# Patient Record
Sex: MALE | Race: AMERICAN INDIAN OR ALASKA NATIVE | NOT HISPANIC OR LATINO | Employment: UNEMPLOYED | URBAN - METROPOLITAN AREA
[De-identification: names, ages, dates, MRNs, and addresses within clinical notes are randomized per-mention and may not be internally consistent; named-entity substitution may affect disease eponyms.]

---
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
## 2020-06-27 ENCOUNTER — HOSPITAL ENCOUNTER (EMERGENCY)
Facility: MEDICAL CENTER | Age: 20
End: 2020-06-27
Attending: EMERGENCY MEDICINE
Payer: MEDICAID

## 2020-06-27 VITALS
WEIGHT: 175.49 LBS | HEART RATE: 72 BPM | HEIGHT: 69 IN | TEMPERATURE: 97.8 F | RESPIRATION RATE: 18 BRPM | DIASTOLIC BLOOD PRESSURE: 87 MMHG | OXYGEN SATURATION: 96 % | BODY MASS INDEX: 25.99 KG/M2 | SYSTOLIC BLOOD PRESSURE: 151 MMHG

## 2020-06-27 DIAGNOSIS — K08.89 PAIN, DENTAL: ICD-10-CM

## 2020-06-27 DIAGNOSIS — Z20.822 SUSPECTED COVID-19 VIRUS INFECTION: ICD-10-CM

## 2020-06-27 LAB
COVID ORDER STATUS COVID19: NORMAL
SARS-COV-2 RNA RESP QL NAA+PROBE: NOTDETECTED
SPECIMEN SOURCE: NORMAL

## 2020-06-27 PROCEDURE — U0003 INFECTIOUS AGENT DETECTION BY NUCLEIC ACID (DNA OR RNA); SEVERE ACUTE RESPIRATORY SYNDROME CORONAVIRUS 2 (SARS-COV-2) (CORONAVIRUS DISEASE [COVID-19]), AMPLIFIED PROBE TECHNIQUE, MAKING USE OF HIGH THROUGHPUT TECHNOLOGIES AS DESCRIBED BY CMS-2020-01-R: HCPCS | Mod: EDC

## 2020-06-27 PROCEDURE — 99283 EMERGENCY DEPT VISIT LOW MDM: CPT | Mod: EDC

## 2020-06-27 PROCEDURE — C9803 HOPD COVID-19 SPEC COLLECT: HCPCS | Mod: EDC | Performed by: EMERGENCY MEDICINE

## 2020-06-27 RX ORDER — AMOXICILLIN 500 MG/1
500 CAPSULE ORAL 3 TIMES DAILY
Qty: 21 CAP | Refills: 0 | Status: SHIPPED | OUTPATIENT
Start: 2020-06-27 | End: 2020-07-04

## 2020-06-27 NOTE — DISCHARGE INSTRUCTIONS
You likely have a viral illness and may have COVID-19.Therefore, COVID-19 is not ruled out and you will need to remain in home quarantine until your test results return.   If you develop significant shortness of breath, meaning that it is difficult for you to walk even short distances without having to stop and catch your breath, or you become severely dizzy and this is persistent then please return to the emergency department.

## 2020-06-27 NOTE — ED NOTES
[facility]y Damon has been [facility].    Discharge instructions, which include signs and symptoms to monitor for, hydration and hand hygiene importance, as well as detailed information regarding Dental pain provided.  This RN also encouraged a follow- up appointment to be made with patient's PCP and dentist.  All questions and concerns addressed at this time.        Prescription for Amoxicillin provided to patient. Patient instructed on importance of completing full course of medication, verbalized understanding.     Patient leaves ER in no apparent distress, is awake, alert and appropriate. Patient is aware of the need to return to the ER for any concerns or changes in current condition.

## 2020-06-27 NOTE — ED NOTES
Pt to room 53. Reviewed and agree with triage note. Pt states that he has had increase in pain but only noticed circular white patch to gums under back left tooth. Pt provided hospital gown, provided warm blanket and call light within reach. Chart up for ERP
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
## 2020-06-27 NOTE — ED TRIAGE NOTES
"Chief Complaint   Patient presents with   • Dental Pain     L lower pain for ~2 months. Pt reports possible abscess, c/o pain and white pocket noticed this am.      /97   Pulse 85   Temp 36.3 °C (97.3 °F) (Temporal)   Resp 12   Ht 1.753 m (5' 9\")   Wt 79.6 kg (175 lb 7.8 oz)   SpO2 98%   BMI 25.91 kg/m²     Pt ambulated into triage, mask in place. VS as above, NAD, encouraged to return to the triage nurse or tech with any new complaints or symptoms.  "

## 2020-06-28 NOTE — ED NOTES
COVID-19 Test Follow Up  06/28/20      Patient tested negative for COVID-19. I have informed the patient of the result by phone. Encouraged to stay at home until no fever for 72 hours without the use of fever reducing medications and symptoms improving. Informed there is no need to further self-isolate for 14 days for COVID-19 unless otherwise directed by the Health Dept.     They are advised to return to the ER for worsening symptoms including difficulty breathing, ongoing fever, weakness or chest pain.    Yen Wolf, PharmD

## 2020-07-05 ENCOUNTER — HOSPITAL ENCOUNTER (EMERGENCY)
Facility: MEDICAL CENTER | Age: 20
End: 2020-07-06
Attending: EMERGENCY MEDICINE
Payer: MEDICAID

## 2020-07-05 DIAGNOSIS — R11.2 NON-INTRACTABLE VOMITING WITH NAUSEA, UNSPECIFIED VOMITING TYPE: ICD-10-CM

## 2020-07-05 DIAGNOSIS — N34.2 URETHRITIS: ICD-10-CM

## 2020-07-05 DIAGNOSIS — J02.9 PHARYNGITIS, UNSPECIFIED ETIOLOGY: ICD-10-CM

## 2020-07-05 DIAGNOSIS — E86.0 DEHYDRATION: ICD-10-CM

## 2020-07-05 LAB
ALBUMIN SERPL BCP-MCNC: 4.6 G/DL (ref 3.2–4.9)
ALBUMIN/GLOB SERPL: 1.3 G/DL
ALP SERPL-CCNC: 103 U/L (ref 30–99)
ALT SERPL-CCNC: 35 U/L (ref 2–50)
ANION GAP SERPL CALC-SCNC: 16 MMOL/L (ref 7–16)
AST SERPL-CCNC: 26 U/L (ref 12–45)
BASOPHILS # BLD AUTO: 0.4 % (ref 0–1.8)
BASOPHILS # BLD: 0.07 K/UL (ref 0–0.12)
BILIRUB SERPL-MCNC: 0.8 MG/DL (ref 0.1–1.5)
BUN SERPL-MCNC: 11 MG/DL (ref 8–22)
CALCIUM SERPL-MCNC: 9.7 MG/DL (ref 8.5–10.5)
CHLORIDE SERPL-SCNC: 100 MMOL/L (ref 96–112)
CO2 SERPL-SCNC: 20 MMOL/L (ref 20–33)
CREAT SERPL-MCNC: 1 MG/DL (ref 0.5–1.4)
EOSINOPHIL # BLD AUTO: 0.04 K/UL (ref 0–0.51)
EOSINOPHIL NFR BLD: 0.2 % (ref 0–6.9)
ERYTHROCYTE [DISTWIDTH] IN BLOOD BY AUTOMATED COUNT: 42.5 FL (ref 35.9–50)
GLOBULIN SER CALC-MCNC: 3.6 G/DL (ref 1.9–3.5)
GLUCOSE SERPL-MCNC: 111 MG/DL (ref 65–99)
HCT VFR BLD AUTO: 47.6 % (ref 42–52)
HETEROPH AB SER QL: NEGATIVE
HGB BLD-MCNC: 16.2 G/DL (ref 14–18)
IMM GRANULOCYTES # BLD AUTO: 0.13 K/UL (ref 0–0.11)
IMM GRANULOCYTES NFR BLD AUTO: 0.8 % (ref 0–0.9)
LACTATE BLD-SCNC: 1.5 MMOL/L (ref 0.5–2)
LYMPHOCYTES # BLD AUTO: 0.83 K/UL (ref 1–4.8)
LYMPHOCYTES NFR BLD: 5 % (ref 22–41)
MCH RBC QN AUTO: 31.5 PG (ref 27–33)
MCHC RBC AUTO-ENTMCNC: 34 G/DL (ref 33.7–35.3)
MCV RBC AUTO: 92.6 FL (ref 81.4–97.8)
MONOCYTES # BLD AUTO: 1.63 K/UL (ref 0–0.85)
MONOCYTES NFR BLD AUTO: 9.9 % (ref 0–13.4)
NEUTROPHILS # BLD AUTO: 13.84 K/UL (ref 1.82–7.42)
NEUTROPHILS NFR BLD: 83.7 % (ref 44–72)
NRBC # BLD AUTO: 0 K/UL
NRBC BLD-RTO: 0 /100 WBC
PLATELET # BLD AUTO: 212 K/UL (ref 164–446)
PMV BLD AUTO: 9.3 FL (ref 9–12.9)
POTASSIUM SERPL-SCNC: 4.2 MMOL/L (ref 3.6–5.5)
PROT SERPL-MCNC: 8.2 G/DL (ref 6–8.2)
RBC # BLD AUTO: 5.14 M/UL (ref 4.7–6.1)
S PYO DNA SPEC NAA+PROBE: NOT DETECTED
SODIUM SERPL-SCNC: 136 MMOL/L (ref 135–145)
WBC # BLD AUTO: 16.5 K/UL (ref 4.8–10.8)

## 2020-07-05 PROCEDURE — A9270 NON-COVERED ITEM OR SERVICE: HCPCS | Performed by: EMERGENCY MEDICINE

## 2020-07-05 PROCEDURE — 86308 HETEROPHILE ANTIBODY SCREEN: CPT

## 2020-07-05 PROCEDURE — 99284 EMERGENCY DEPT VISIT MOD MDM: CPT

## 2020-07-05 PROCEDURE — 87651 STREP A DNA AMP PROBE: CPT

## 2020-07-05 PROCEDURE — 700105 HCHG RX REV CODE 258: Performed by: EMERGENCY MEDICINE

## 2020-07-05 PROCEDURE — 700111 HCHG RX REV CODE 636 W/ 250 OVERRIDE (IP): Performed by: EMERGENCY MEDICINE

## 2020-07-05 PROCEDURE — 80053 COMPREHEN METABOLIC PANEL: CPT

## 2020-07-05 PROCEDURE — 700102 HCHG RX REV CODE 250 W/ 637 OVERRIDE(OP): Performed by: EMERGENCY MEDICINE

## 2020-07-05 PROCEDURE — 36415 COLL VENOUS BLD VENIPUNCTURE: CPT

## 2020-07-05 PROCEDURE — 96375 TX/PRO/DX INJ NEW DRUG ADDON: CPT

## 2020-07-05 PROCEDURE — C9803 HOPD COVID-19 SPEC COLLECT: HCPCS | Performed by: EMERGENCY MEDICINE

## 2020-07-05 PROCEDURE — 85025 COMPLETE CBC W/AUTO DIFF WBC: CPT

## 2020-07-05 PROCEDURE — 83605 ASSAY OF LACTIC ACID: CPT

## 2020-07-05 PROCEDURE — 96374 THER/PROPH/DIAG INJ IV PUSH: CPT

## 2020-07-05 RX ORDER — SODIUM CHLORIDE, SODIUM LACTATE, POTASSIUM CHLORIDE, CALCIUM CHLORIDE 600; 310; 30; 20 MG/100ML; MG/100ML; MG/100ML; MG/100ML
1000 INJECTION, SOLUTION INTRAVENOUS ONCE
Status: COMPLETED | OUTPATIENT
Start: 2020-07-05 | End: 2020-07-05

## 2020-07-05 RX ORDER — HYDROMORPHONE HYDROCHLORIDE 1 MG/ML
0.5 INJECTION, SOLUTION INTRAMUSCULAR; INTRAVENOUS; SUBCUTANEOUS
Status: DISCONTINUED | OUTPATIENT
Start: 2020-07-05 | End: 2020-07-06 | Stop reason: HOSPADM

## 2020-07-05 RX ORDER — ACETAMINOPHEN 500 MG
1000 TABLET ORAL ONCE
Status: COMPLETED | OUTPATIENT
Start: 2020-07-05 | End: 2020-07-05

## 2020-07-05 RX ORDER — ONDANSETRON 2 MG/ML
4 INJECTION INTRAMUSCULAR; INTRAVENOUS ONCE
Status: COMPLETED | OUTPATIENT
Start: 2020-07-05 | End: 2020-07-05

## 2020-07-05 RX ORDER — KETOROLAC TROMETHAMINE 30 MG/ML
15 INJECTION, SOLUTION INTRAMUSCULAR; INTRAVENOUS ONCE
Status: COMPLETED | OUTPATIENT
Start: 2020-07-05 | End: 2020-07-05

## 2020-07-05 RX ADMIN — ONDANSETRON 4 MG: 2 INJECTION INTRAMUSCULAR; INTRAVENOUS at 22:39

## 2020-07-05 RX ADMIN — KETOROLAC TROMETHAMINE 15 MG: 30 INJECTION, SOLUTION INTRAMUSCULAR at 22:40

## 2020-07-05 RX ADMIN — HYDROMORPHONE HYDROCHLORIDE 0.5 MG: 1 INJECTION, SOLUTION INTRAMUSCULAR; INTRAVENOUS; SUBCUTANEOUS at 23:31

## 2020-07-05 RX ADMIN — SODIUM CHLORIDE, POTASSIUM CHLORIDE, SODIUM LACTATE AND CALCIUM CHLORIDE 1000 ML: 600; 310; 30; 20 INJECTION, SOLUTION INTRAVENOUS at 22:41

## 2020-07-05 RX ADMIN — ACETAMINOPHEN 1000 MG: 500 TABLET ORAL at 22:38

## 2020-07-05 NOTE — LETTER
7/7/2020               Ry Damon  [address] [place] [address]        Dear Ry (MR#[id number]),    This letter is to inform you that your COVID-19 test result is NEGATIVE.  This means that the virus that causes COVID-19 was not found in your sample.      A review of your test during your recent visit requires that we notify you of the following:    Your nasal swab was negative for the novel coronavirus (COVID-19).     You are encouraged to stay at home until you have had no fever for 72 hours without the use of fever reducing medications and your symptoms are improving. There is no need for further self-quarantine for 14 days for COVID-19 unless otherwise directed by the Health Department.     For any further questions regarding COVID-19, please contact the [facility] at [phone].  Thank you for your cooperation in the matter.      Sincerely,    Vero Arroyo, PharmD  Ph: [phone]  [facility] Care Team

## 2020-07-06 VITALS
WEIGHT: 175 LBS | HEIGHT: 69 IN | HEART RATE: 96 BPM | TEMPERATURE: 99.9 F | BODY MASS INDEX: 25.92 KG/M2 | SYSTOLIC BLOOD PRESSURE: 121 MMHG | DIASTOLIC BLOOD PRESSURE: 77 MMHG | RESPIRATION RATE: 18 BRPM | OXYGEN SATURATION: 98 %

## 2020-07-06 LAB
C TRACH DNA SPEC QL NAA+PROBE: NEGATIVE
COVID ORDER STATUS COVID19: NORMAL
N GONORRHOEA DNA SPEC QL NAA+PROBE: NEGATIVE
SARS-COV-2 RNA RESP QL NAA+PROBE: NOTDETECTED
SPECIMEN SOURCE: NORMAL
SPECIMEN SOURCE: NORMAL

## 2020-07-06 PROCEDURE — 87591 N.GONORRHOEAE DNA AMP PROB: CPT

## 2020-07-06 PROCEDURE — A9270 NON-COVERED ITEM OR SERVICE: HCPCS | Performed by: EMERGENCY MEDICINE

## 2020-07-06 PROCEDURE — U0003 INFECTIOUS AGENT DETECTION BY NUCLEIC ACID (DNA OR RNA); SEVERE ACUTE RESPIRATORY SYNDROME CORONAVIRUS 2 (SARS-COV-2) (CORONAVIRUS DISEASE [COVID-19]), AMPLIFIED PROBE TECHNIQUE, MAKING USE OF HIGH THROUGHPUT TECHNOLOGIES AS DESCRIBED BY CMS-2020-01-R: HCPCS

## 2020-07-06 PROCEDURE — 700111 HCHG RX REV CODE 636 W/ 250 OVERRIDE (IP): Performed by: EMERGENCY MEDICINE

## 2020-07-06 PROCEDURE — 700102 HCHG RX REV CODE 250 W/ 637 OVERRIDE(OP): Performed by: EMERGENCY MEDICINE

## 2020-07-06 PROCEDURE — 87491 CHLMYD TRACH DNA AMP PROBE: CPT

## 2020-07-06 PROCEDURE — 96372 THER/PROPH/DIAG INJ SC/IM: CPT

## 2020-07-06 PROCEDURE — 700101 HCHG RX REV CODE 250: Performed by: RADIOLOGY

## 2020-07-06 RX ORDER — AMOXICILLIN AND CLAVULANATE POTASSIUM 875; 125 MG/1; MG/1
1 TABLET, FILM COATED ORAL ONCE
Status: DISCONTINUED | OUTPATIENT
Start: 2020-07-06 | End: 2020-07-06

## 2020-07-06 RX ORDER — DEXAMETHASONE 4 MG/1
10 TABLET ORAL ONCE
Status: DISCONTINUED | OUTPATIENT
Start: 2020-07-06 | End: 2020-07-06

## 2020-07-06 RX ORDER — AZITHROMYCIN 250 MG/1
1000 TABLET, FILM COATED ORAL ONCE
Status: COMPLETED | OUTPATIENT
Start: 2020-07-06 | End: 2020-07-06

## 2020-07-06 RX ORDER — CEFTRIAXONE SODIUM 250 MG/1
250 INJECTION, POWDER, FOR SOLUTION INTRAMUSCULAR; INTRAVENOUS ONCE
Status: COMPLETED | OUTPATIENT
Start: 2020-07-06 | End: 2020-07-06

## 2020-07-06 RX ADMIN — LIDOCAINE HYDROCHLORIDE 0.9 ML: 10 INJECTION, SOLUTION INFILTRATION; PERINEURAL at 00:44

## 2020-07-06 RX ADMIN — CEFTRIAXONE SODIUM 250 MG: 250 INJECTION, POWDER, FOR SOLUTION INTRAMUSCULAR; INTRAVENOUS at 00:44

## 2020-07-06 RX ADMIN — PENICILLIN G BENZATHINE 2.4 MILLION UNITS: 1200000 INJECTION, SUSPENSION INTRAMUSCULAR at 01:31

## 2020-07-06 RX ADMIN — AZITHROMYCIN MONOHYDRATE 1000 MG: 250 TABLET ORAL at 00:44

## 2020-07-06 ASSESSMENT — FIBROSIS 4 INDEX: FIB4 SCORE: 0.41

## 2020-07-06 NOTE — ED PROVIDER NOTES
ED Provider Note    CHIEF COMPLAINT  Chief Complaint   Patient presents with   • Blood in Vomit     pt here today for vommting bright red blood. also complaining of migraine like headache and body chills.   • Headache       HPI  Ry Hasmukh Damon is a 20 y.o. male who presents for evaluation of 2 days of sore throat, nausea, vomiting, and headache.  Patient states the symptoms started at the same time and he feels that the sore throat is the worst part.  He notes the vomit has had blood streaking a few times however the last few times he has been vomiting may have been dry heaves only.  He notes no persistent abdominal pain but does note cramping when vomiting.  He denies any diarrhea or constipation.  He does not note any neck stiffness or pain.    REVIEW OF SYSTEMS  Constitutional: Subjective fevers and chills  Skin: No rashes  HEENT: No ear pain, ringing in ears, or decreased hearing.  Sore throat with painful swallowing.  No difficulty speaking.  Neck: No neck pain, stiffness, or masses.  Chest: No pain   Pulm: No shortness of breath, cough, wheezing, stridor, or pain with inspiration/expiration  Gastrointestinal: No diarrhea, constipation, bloating, melena, hematochezia or pain.  Musculoskeletal: No pain, swelling, weakness  Neurologic: No sensory or motor changes. No confusion or disorientation.  Heme: No bleeding or bruising problems.   Immuno: No hx of recurrent infections      PAST MEDICAL HISTORY   has a past medical history of Asthma.    SOCIAL HISTORY  Social History     Tobacco Use   • Smoking status: Former Smoker     Packs/day: 0.10     Types: Cigarettes     Last attempt to quit: 7/20/2016     Years since quitting: 3.9   • Smokeless tobacco: Never Used   Substance and Sexual Activity   • Alcohol use: Yes     Alcohol/week: 0.0 oz     Frequency: Never     Comment: occ   • Drug use: Never   • Sexual activity: Not Currently       SURGICAL HISTORY   has a past surgical history that includes  "evacuation of hematoma (Right, 8/31/2019); zygomatic arch orif (Right, 8/31/2019); and submandible abscess incision and drainage (Right, 9/6/2019).    CURRENT MEDICATIONS  Home Medications    **Home medications have not yet been reviewed for this encounter**         ALLERGIES  No Known Allergies    PHYSICAL EXAM  VITAL SIGNS: /65   Pulse 98   Temp 37.8 °C (100.1 °F) (Oral)   Resp 18   Ht 1.753 m (5' 9\")   Wt 79.4 kg (175 lb)   SpO2 97%   BMI 25.84 kg/m²    Gen: Appears tired, otherwise alert  HEENT: Markedly erythemic and moderately swollen tonsils bilaterally.  There appears to be exudate and possibly small ulcerations on both.  No palatal petechiae or uvula deviation .no signs of trauma, Bilateral external ears normal, Nose normal. Conjunctiva normal, Non-icteric.   Neck:  No tenderness, Supple, No masses.  Patient able to range neck in flexion, extension, and rotation without distress or limitation  Lymphatic: Shotty anterior cervical adenopathy.  Cardiovascular: Cardiac, regular rhythm, no murmurs.  Capillary refill less than 3 seconds to all extremities, 2+ distal pulses.  Thorax & Lungs: Normal breath sounds, No respiratory distress, No wheezing bilateral chest rise  Abdomen: Bowel sounds normal, Soft, No tenderness, No masses, No pulsatile masses. No Guarding or rebound  Skin: Warm, Dry, No erythema, No rash noted to exposed areas.   Back: No bony tenderness, No CVA tenderness.   Extremities: Intact distal pulses, No edema  Neurologic: Alert , no facial droop, grossly normal coordination and strength      LABS  Results for orders placed or performed during the hospital encounter of 07/05/20   CBC WITH DIFFERENTIAL   Result Value Ref Range    WBC 16.5 (H) 4.8 - 10.8 K/uL    RBC 5.14 4.70 - 6.10 M/uL    Hemoglobin 16.2 14.0 - 18.0 g/dL    Hematocrit 47.6 42.0 - 52.0 %    MCV 92.6 81.4 - 97.8 fL    MCH 31.5 27.0 - 33.0 pg    MCHC 34.0 33.7 - 35.3 g/dL    RDW 42.5 35.9 - 50.0 fL    Platelet Count 212 " 164 - 446 K/uL    MPV 9.3 9.0 - 12.9 fL    Neutrophils-Polys 83.70 (H) 44.00 - 72.00 %    Lymphocytes 5.00 (L) 22.00 - 41.00 %    Monocytes 9.90 0.00 - 13.40 %    Eosinophils 0.20 0.00 - 6.90 %    Basophils 0.40 0.00 - 1.80 %    Immature Granulocytes 0.80 0.00 - 0.90 %    Nucleated RBC 0.00 /100 WBC    Neutrophils (Absolute) 13.84 (H) 1.82 - 7.42 K/uL    Lymphs (Absolute) 0.83 (L) 1.00 - 4.80 K/uL    Monos (Absolute) 1.63 (H) 0.00 - 0.85 K/uL    Eos (Absolute) 0.04 0.00 - 0.51 K/uL    Baso (Absolute) 0.07 0.00 - 0.12 K/uL    Immature Granulocytes (abs) 0.13 (H) 0.00 - 0.11 K/uL    NRBC (Absolute) 0.00 K/uL   COMP METABOLIC PANEL   Result Value Ref Range    Sodium 136 135 - 145 mmol/L    Potassium 4.2 3.6 - 5.5 mmol/L    Chloride 100 96 - 112 mmol/L    Co2 20 20 - 33 mmol/L    Anion Gap 16.0 7.0 - 16.0    Glucose 111 (H) 65 - 99 mg/dL    Bun 11 8 - 22 mg/dL    Creatinine 1.00 0.50 - 1.40 mg/dL    Calcium 9.7 8.5 - 10.5 mg/dL    AST(SGOT) 26 12 - 45 U/L    ALT(SGPT) 35 2 - 50 U/L    Alkaline Phosphatase 103 (H) 30 - 99 U/L    Total Bilirubin 0.8 0.1 - 1.5 mg/dL    Albumin 4.6 3.2 - 4.9 g/dL    Total Protein 8.2 6.0 - 8.2 g/dL    Globulin 3.6 (H) 1.9 - 3.5 g/dL    A-G Ratio 1.3 g/dL   Group A Strep by PCR    Specimen: Throat   Result Value Ref Range    Group A Strep by PCR Not Detected Not Detected   MONONUCLEOSIS TEST QUAL   Result Value Ref Range    Heterophile Screen Negative Negative   LACTIC ACID   Result Value Ref Range    Lactic Acid 1.5 0.5 - 2.0 mmol/L   ESTIMATED GFR   Result Value Ref Range    GFR If African American >60 >60 mL/min/1.73 m 2    GFR If Non African American >60 >60 mL/min/1.73 m 2   COVID/SARS CoV-2 PCR    Specimen: Nasopharyngeal; Respirate   Result Value Ref Range    COVID Order Status Received        RADIOLOGY  No orders to display       HYDRATION: Based on the patient's presentation of Tachycardia the patient was given IV fluids. IV Hydration was used because oral hydration was not adequate  alone. Upon recheck following hydration, the patient was stable, improved.    COURSE & MEDICAL DECISION MAKING  Patient arrives for evaluation of what appears to be pharyngitis, possibly strep related.  He does not have any neck stiffness or pain to suggest meningitis but does have a lingering headache.  His headache was not sudden onset not related to trauma and I very much doubt is related to intracranial hemorrhage or subarachnoid hemorrhage.  He does not appear altered and I do not feel lumbar puncture is in the patient's best interest at this time as it is unlikely to .  I will give the patient fluids as he is tachycardic and attempt to control his fever with Tylenol.  We will give him pain control with Dilaudid and Toradol as he has been shown to have normal renal function very recently.  He also had a normal COVID test on the 27th and I do not feel his symptoms today warrant retesting as they are not respiratory in nature.  Laboratory evaluation will be beneficial as he may meet criteria for sepsis and may need intravenous antibiotics and admission.  At this point I do not suspect a peritonsillar abscess or parapharyngeal spread of the infection.    12:15 AM  Reevaluated patient at bedside.  His heart rate is in the 90s and blood pressure appears stable.  He is awake, alert, and appears nontoxic.  He states he is feeling much better after fluids and pain medication.  Patient offered more information including the fact that he felt he might have an STD and has actually been having burning with urination.  He has no sores around his penis and no testicular pain or swelling.  He has concerns that his throat may be related to an STD as well.  I discussed options with the patient and I feel empiric treatment with Rocephin, azithromycin, and Bicillin for gonorrhea, chlamydia, and syphilis is reasonable given his risky sexual conduct.  Patient is phonating normally and tolerating p.o. fluids without  difficulty.  At this point I do not feel empiric treatment for strep is necessary given the negative strep screen and I feel his symptoms are very unlikely to be related COVID although I did agree to test him as he was concerned about exposure in the recent past.  Patient stated understanding that if GC or chlamydia come back positive on his urine, we will contact him at home to inform him.  Although patient does meet criteria for sepsis with a fever and tachycardia, his fever did resolve with treatment and his heart rate, although it stayed above 90, did come down to an acceptable range.  I do not feel he requires further inpatient treatment or admission as he can adequately hydrate at home. In the meantime, he will avoid any sexual contact until he knows the results of the test and will return if his symptoms worsen or change in any way.      FINAL IMPRESSION  1. Pharyngitis, unspecified etiology    2. Dehydration    3. Non-intractable vomiting with nausea, unspecified vomiting type        Electronically signed by: Daniel Belle M.D., 7/5/2020 10:09 PM

## 2020-07-06 NOTE — ED TRIAGE NOTES
Chief Complaint   Patient presents with   • Blood in Vomit     pt here today for vommting bright red blood. also complaining of migraine like headache and body chills.   • Headache       Pt walk in today for the above complaint. Pt states he hung out with someone who was a positive COVID test (from 3 weeks ago) and hung out with them two days ago. Pt states symptoms started last night and that his migraine got progressively worse today. Pt ambulatory to room, aox4      /85   Pulse (!) 123   Temp (!) 38.2 °C (100.8 °F) (Oral)   Resp 18   SpO2 96%

## 2020-07-07 NOTE — ED NOTES
COVID-19 Test Follow Up  07/07/20      Patient tested negative for COVID-19. Attempted to contact the patient to discuss results but there was no answer or VM.  I will send the patient a letter to inform him and encourage him to stay at home until no fever for 72 hours without the use of fever reducing medications and symptoms improving. Informed there is no need to further self-isolate for 14 days for COVID-19 unless otherwise directed by the Health Dept.     SARS-CoV-2 Source  NP Swab     SARS-CoV-2 by PCR  NotDetected      Vero Arroyo, PharmD

## 2023-09-19 ENCOUNTER — APPOINTMENT (OUTPATIENT)
Dept: RADIOLOGY | Facility: MEDICAL CENTER | Age: 23
End: 2023-09-19
Attending: EMERGENCY MEDICINE
Payer: MEDICAID

## 2023-09-19 ENCOUNTER — HOSPITAL ENCOUNTER (EMERGENCY)
Facility: MEDICAL CENTER | Age: 23
End: 2023-09-19
Attending: EMERGENCY MEDICINE
Payer: MEDICAID

## 2023-09-19 VITALS
BODY MASS INDEX: 26.99 KG/M2 | HEART RATE: 70 BPM | WEIGHT: 188.49 LBS | TEMPERATURE: 97 F | HEIGHT: 70 IN | DIASTOLIC BLOOD PRESSURE: 83 MMHG | RESPIRATION RATE: 18 BRPM | SYSTOLIC BLOOD PRESSURE: 125 MMHG | OXYGEN SATURATION: 97 %

## 2023-09-19 DIAGNOSIS — T41.0X5A ADVERSE EFFECT OF NITROUS OXIDE: ICD-10-CM

## 2023-09-19 DIAGNOSIS — G62.9 NEUROPATHY: ICD-10-CM

## 2023-09-19 PROCEDURE — 99284 EMERGENCY DEPT VISIT MOD MDM: CPT

## 2023-09-19 PROCEDURE — 70450 CT HEAD/BRAIN W/O DYE: CPT

## 2023-09-19 PROCEDURE — 72125 CT NECK SPINE W/O DYE: CPT

## 2023-09-19 NOTE — ED TRIAGE NOTES
"Chief Complaint   Patient presents with    Head Injury     Pt fell off a step stool while trying to install blinds hitting his head on the corner of a counter two weeks ago   Pt reports that he came in today because he now has numbness in his feet      /82   Pulse 68   Temp 36 °C (96.8 °F) (Temporal)   Resp 16   Ht 1.778 m (5' 10\")   Wt 85.5 kg (188 lb 7.9 oz)   SpO2 96%   BMI 27.05 kg/m²       "

## 2023-09-19 NOTE — ED NOTES
ERP at bedside. Pt agrees with plan of care discussed by ERP. Oksana in low position, side rail up for pt safety. Call light within reach. Plan of care on-going

## 2023-09-19 NOTE — ED PROVIDER NOTES
"ED Provider Note    CHIEF COMPLAINT  Chief Complaint   Patient presents with    Head Injury     Pt fell off a step stool while trying to install blinds hitting his head on the corner of a counter two weeks ago   Pt reports that he came in today because he now has numbness in his feet        LIMITATION TO HISTORY   Select: None    HPI    Ry BAYRON Nelson is a 23 y.o. male was unfortunately hit his head about 4 weeks ago comes in today with a chief complaint of numbness in his feet.  He describes it as numbness.  Initially on the bottom of his feet.  Goes up today to his knees.  He describes it as \"sand\" when it is touched.  There is no alleviating factors worse when he flexes his neck.  He states that duration has been about for 5 days.  He also woke up with his hands going numb but that lasted for about a few minutes and then quickly got better.  There is no associated blurred vision.  There is no electric-like shock down his legs.  There is no bowel or bladder incontinence.    Patient states that about 4 weeks ago he was putting up blinds he had a stepstool which the chair did not out full completely he fell and hit his head on the bed table and subsequently suffered head injury head laceration that did not take care.    He has had headaches since then on and off and dizziness and some nausea.  His right eyelid has been flickering.    Has been exacerbate his anxiety stating he gets anxious about this and therefore short of breath.    There is a family history of multiple sclerosis.  Concerned this may be first-time occurrence of MS.    OUTSIDE HISTORIAN(S):  Select: Grandmother is at the bedside    EXTERNAL RECORDS REVIEWED  Select: Other   Admission Date  9/5/2019     CODE STATUS  Full Code     HPI & HOSPITAL COURSE      20 y/o M with NO PMH here for right-sided facial swelling and found to have facial abscess.  He has recent history of trauma and surgery done in the past.  Because thought that oral surgeon was " consulted and did incision and drainage 3 days ago without complication.  His swelling and pain continued to improve.  Wound culture grew gram-positive cocci.  Initially he was put on empiric IV antibiotic.  According to the surgeon he can be discharged home with oral Augmentin for 2 weeks and follow-up with him next week to remove the drain.  I saw and examined the patient today.  He was instructed to come back to ER if his symptoms get worse.     Therefore, he is discharged in fair and stable condition to home with close outpatient follow-up.     The patient met 2-midnight criteria for an inpatient stay at the time of discharge.     Discharge Date  19    REVIEW OF SYSTEMS  See above.  Neurologic no bowel bladder incontinence    PAST MEDICAL HISTORY  Past Medical History:   Diagnosis Date    Asthma     none since age 12       FAMILY HISTORY  Family History   Problem Relation Age of Onset    No Known Problems Mother     No Known Problems Father     Cancer Neg Hx     Diabetes Neg Hx     Heart Disease Neg Hx     Stroke Neg Hx        SOCIAL HISTORY  Social History     Tobacco Use    Smoking status: Every Day     Current packs/day: 0.00     Types: Cigarettes     Last attempt to quit: 2016     Years since quittin.1    Smokeless tobacco: Never   Vaping Use    Vaping Use: Never used   Substance Use Topics    Alcohol use: Yes     Comment: has a few drinks once a week    Drug use: Never     Social History     Substance and Sexual Activity   Drug Use Never       SURGICAL HISTORY  Past Surgical History:   Procedure Laterality Date    SUBMANDIBLE ABSCESS INCISION AND DRAINAGE Right 2019    Procedure: INCISION AND DRAINAGE, ABSCESS, SUBMANDIBULAR REGION;  Surgeon: Derik Cedeno D.D.S.;  Location: SURGERY [facility];  Service: Oral Surgery    EVACUATION OF HEMATOMA Right 2019    Procedure: EVACUATION, HEMATOMA;  Surgeon: Derik Cedeno D.D.S.;  Location: [facility];   "Service: Oral Surgery    ZYGOMATIC ARCH ORIF Right 8/31/2019    Procedure: ORIF, FRACTURE, ZYGOMATIC ARCH;  Surgeon: Derik Cedeno D.D.S.;  Location: SURGERY [facility];  Service: Oral Surgery       CURRENT MEDICATIONS  No current facility-administered medications for this encounter.  No current outpatient medications on file.    ALLERGIES  No Known Allergies    PHYSICAL EXAM  VITAL SIGNS: /82   Pulse 68   Temp 36 °C (96.8 °F) (Temporal)   Resp 16   Ht 1.778 m (5' 10\")   Wt 85.5 kg (188 lb 7.9 oz)   SpO2 96%   BMI 27.05 kg/m²   Reviewed and noted.  Constitutional: Well developed, Well nourished, cute distress.  HENT: Normocephalic, atraumatic, bilateral external ears normal, No intraoral erythema, edema, exudate  Eyes: PERRLA, conjunctiva pink, no scleral icterus.   Cardiovascular: Strong pedal pulses bilaterally  Respiratory: L no respiratory distress or stridor  Abdominal:  Abdomen soft, non-tender, non distended. No rebound, or guarding.    Skin: No erythema, no rash. No wounds or bruising.  Genitourinary: No costovertebral angle tenderness.   Musculoskeletal: no deformities.   Neurologic: She has 5-5 strength in foot flexion extension.  He is able to lift his feet above the bed for 5 seconds without deviation.  He stated decrease in station from the knees down in stocking-like fashion.  Stating had feels like \"sand\".  Psychiatric: Slightly anxious appearing        MEDICAL DECISION MAKING:  PROBLEMS EVALUATED THIS VISIT:  Numbness bilateral lower legs.  Etiology uncertain spinal cord versus peripheral neuropathy versus other etiology.  Patient has no signs of cauda equina syndrome or paralysis noted.         PLAN:  CT head  CT C-spine  Both look for signs of acute trauma fracture obvious lesion    RISK:  Patient at this point is at moderate risk of worsening symptoms due to his history and symptoms and past medical history.  CT scan and further imaging was done.  Patient at this point " was noted to be stable but requires further follow-up for his conditions    RESULTS      LABS Ordered and Reviewed by Me:  Results for orders placed or performed during the hospital encounter of 07/05/20   CBC WITH DIFFERENTIAL   Result Value Ref Range    WBC 16.5 (H) 4.8 - 10.8 K/uL    RBC 5.14 4.70 - 6.10 M/uL    Hemoglobin 16.2 14.0 - 18.0 g/dL    Hematocrit 47.6 42.0 - 52.0 %    MCV 92.6 81.4 - 97.8 fL    MCH 31.5 27.0 - 33.0 pg    MCHC 34.0 33.7 - 35.3 g/dL    RDW 42.5 35.9 - 50.0 fL    Platelet Count 212 164 - 446 K/uL    MPV 9.3 9.0 - 12.9 fL    Neutrophils-Polys 83.70 (H) 44.00 - 72.00 %    Lymphocytes 5.00 (L) 22.00 - 41.00 %    Monocytes 9.90 0.00 - 13.40 %    Eosinophils 0.20 0.00 - 6.90 %    Basophils 0.40 0.00 - 1.80 %    Immature Granulocytes 0.80 0.00 - 0.90 %    Nucleated RBC 0.00 /100 WBC    Neutrophils (Absolute) 13.84 (H) 1.82 - 7.42 K/uL    Lymphs (Absolute) 0.83 (L) 1.00 - 4.80 K/uL    Monos (Absolute) 1.63 (H) 0.00 - 0.85 K/uL    Eos (Absolute) 0.04 0.00 - 0.51 K/uL    Baso (Absolute) 0.07 0.00 - 0.12 K/uL    Immature Granulocytes (abs) 0.13 (H) 0.00 - 0.11 K/uL    NRBC (Absolute) 0.00 K/uL   COMP METABOLIC PANEL   Result Value Ref Range    Sodium 136 135 - 145 mmol/L    Potassium 4.2 3.6 - 5.5 mmol/L    Chloride 100 96 - 112 mmol/L    Co2 20 20 - 33 mmol/L    Anion Gap 16.0 7.0 - 16.0    Glucose 111 (H) 65 - 99 mg/dL    Bun 11 8 - 22 mg/dL    Creatinine 1.00 0.50 - 1.40 mg/dL    Calcium 9.7 8.5 - 10.5 mg/dL    AST(SGOT) 26 12 - 45 U/L    ALT(SGPT) 35 2 - 50 U/L    Alkaline Phosphatase 103 (H) 30 - 99 U/L    Total Bilirubin 0.8 0.1 - 1.5 mg/dL    Albumin 4.6 3.2 - 4.9 g/dL    Total Protein 8.2 6.0 - 8.2 g/dL    Globulin 3.6 (H) 1.9 - 3.5 g/dL    A-G Ratio 1.3 g/dL   Group A Strep by PCR    Specimen: Throat   Result Value Ref Range    Group A Strep by PCR Not Detected Not Detected   MONONUCLEOSIS TEST QUAL   Result Value Ref Range    Heterophile Screen Negative Negative   LACTIC ACID   Result  Value Ref Range    Lactic Acid 1.5 0.5 - 2.0 mmol/L   ESTIMATED GFR   Result Value Ref Range    GFR If African American >60 >60 mL/min/1.73 m 2    GFR If Non African American >60 >60 mL/min/1.73 m 2   COVID/SARS CoV-2 PCR    Specimen: Nasopharyngeal; Respirate   Result Value Ref Range    COVID Order Status Received    Chlamydia/GC PCR Urine Or Swab    Specimen: Urine, First Catch; Genital   Result Value Ref Range    C. trachomatis by PCR Negative Negative    N. gonorrhoeae by PCR Negative Negative    Source Urine    SARS-CoV-2, PCR (In-House)   Result Value Ref Range    SARS-CoV-2 Source NP Swab     SARS-CoV-2 by PCR NotDetected              Radiologist interpretation:   CT-CSPINE WITHOUT PLUS RECONS   Final Result      1.  There is no acute fracture of the cervical spine.         CT-HEAD W/O   Final Result      1.  Head CT without contrast within normal limits. No evidence of acute cerebral infarction, hemorrhage or mass lesion.               ED COURSE:    ED Observation Status? Yes;   Patient placed in observation status at 4:09 PM 09/19/23     Observation plan is as follows:   See above.  Patient will get treatment follow-up.    Response on recheck:  Informed me before going MRI that he done a lot of whippets.  At this point he can see below that they can cause polyneuropathy and he states that it only occurred after he had done heavy whippets on Saturday.  The timing of this is much more indicative of nitrous oxide use and abuse.    Patient discharged from ED observation at 6:30 PM 09/19/23 and has improved.  Patient with then told us that actual use of what he had used at this point which most likely is causing his neuropathy..        Diagnostic tests and prescription drugs considered including, but not limited to: Select: Patient will follow-up with his primary care doctor.    Escalation of care considered, and ultimately not performed: At this point looks well.     Barriers to care at this time, including but  not limited to: Select: None at this time..       FINAL DISPO PLAN   New Prescriptions    No medications on file     From up-to-date  Nitrous oxide -- Nitrous oxide (N2O) is used as a propellant in canisters of whipped cream, as a power booster in automobiles and motorcycles, and as a sedative/amnestic agent for painful medical and dental procedures. It usually is inhaled from a balloon [30]. Neurologic, hematologic, and reproductive toxicity may result from exposure to N2O.  ?Neurologic effects - Neurologic toxicity is well documented in patients who misuse N2O, and due to its effects on vitamin B12. Neurological findings include polyneuropathy [36,85-87], ataxia [33,86,88], and psychosis [88,89]. The clinical effects are similar to the subacute combined degeneration syndrome associated with pernicious anemia [90]. Neurotoxicity is potentially reversible with vitamin B12 (cyanocobalamin) supplementation and abstinence from N2O.    Followup:  [facility], Emergency Dept  [address]  [facility] [address] [phone]  Go to   If symptoms worsen    Mohamud Mejia M.D.  [address]  [facility] [address] [phone]    Call   For follow up      CONDITION: See above.     FINAL IMPRESSION  1. Neuropathy    2. Adverse effect of nitrous oxide       ED Observation Care

## 2023-09-20 NOTE — DISCHARGE INSTRUCTIONS
Please avoid doing whippets as this can cause some of the symptoms    However, if you continue have any symptoms or if they are not getting better please follow-up with your doctor in the next week or return to the ER.

## 2023-09-20 NOTE — ED NOTES
Discharge instructions provided. Pt verbalized understanding of discharge instructions to follow up with PCP for continuing symptoms and to return to ER if condition worsens. Pt ambulated out of ER without difficulty.
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
## 2023-12-03 ENCOUNTER — HOSPITAL ENCOUNTER (EMERGENCY)
Facility: MEDICAL CENTER | Age: 23
End: 2023-12-03
Attending: EMERGENCY MEDICINE
Payer: MEDICAID

## 2023-12-03 VITALS
HEIGHT: 70 IN | RESPIRATION RATE: 18 BRPM | HEART RATE: 75 BPM | DIASTOLIC BLOOD PRESSURE: 80 MMHG | BODY MASS INDEX: 27.52 KG/M2 | WEIGHT: 192.24 LBS | TEMPERATURE: 98.5 F | OXYGEN SATURATION: 98 % | SYSTOLIC BLOOD PRESSURE: 140 MMHG

## 2023-12-03 DIAGNOSIS — S09.90XA CLOSED HEAD INJURY, INITIAL ENCOUNTER: ICD-10-CM

## 2023-12-03 DIAGNOSIS — S01.01XA SCALP LACERATION, INITIAL ENCOUNTER: ICD-10-CM
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
## 2023-12-03 PROCEDURE — 700102 HCHG RX REV CODE 250 W/ 637 OVERRIDE(OP): Mod: UD | Performed by: EMERGENCY MEDICINE

## 2023-12-03 PROCEDURE — 700111 HCHG RX REV CODE 636 W/ 250 OVERRIDE (IP): Performed by: EMERGENCY MEDICINE

## 2023-12-03 PROCEDURE — 304999 HCHG REPAIR-SIMPLE/INTERMED LEVEL 1

## 2023-12-03 PROCEDURE — 99283 EMERGENCY DEPT VISIT LOW MDM: CPT

## 2023-12-03 PROCEDURE — 305308 HCHG STAPLER,SKIN,DISP.

## 2023-12-03 PROCEDURE — A9270 NON-COVERED ITEM OR SERVICE: HCPCS | Mod: UD | Performed by: EMERGENCY MEDICINE

## 2023-12-03 PROCEDURE — 304217 HCHG IRRIGATION SYSTEM

## 2023-12-03 PROCEDURE — 90715 TDAP VACCINE 7 YRS/> IM: CPT | Performed by: EMERGENCY MEDICINE

## 2023-12-03 PROCEDURE — 90471 IMMUNIZATION ADMIN: CPT

## 2023-12-03 RX ORDER — LIDOCAINE HYDROCHLORIDE AND EPINEPHRINE 10; 10 MG/ML; UG/ML
20 INJECTION, SOLUTION INFILTRATION; PERINEURAL ONCE
Status: DISCONTINUED | OUTPATIENT
Start: 2023-12-03 | End: 2023-12-03 | Stop reason: HOSPADM

## 2023-12-03 RX ORDER — ACETAMINOPHEN 325 MG/1
650 TABLET ORAL ONCE
Status: COMPLETED | OUTPATIENT
Start: 2023-12-03 | End: 2023-12-03

## 2023-12-03 RX ADMIN — CLOSTRIDIUM TETANI TOXOID ANTIGEN (FORMALDEHYDE INACTIVATED), CORYNEBACTERIUM DIPHTHERIAE TOXOID ANTIGEN (FORMALDEHYDE INACTIVATED), BORDETELLA PERTUSSIS TOXOID ANTIGEN (GLUTARALDEHYDE INACTIVATED), BORDETELLA PERTUSSIS FILAMENTOUS HEMAGGLUTININ ANTIGEN (FORMALDEHYDE INACTIVATED), BORDETELLA PERTUSSIS PERTACTIN ANTIGEN, AND BORDETELLA PERTUSSIS FIMBRIAE 2/3 ANTIGEN 0.5 ML: 5; 2; 2.5; 5; 3; 5 INJECTION, SUSPENSION INTRAMUSCULAR at 08:28

## 2023-12-03 RX ADMIN — ACETAMINOPHEN 650 MG: 325 TABLET, FILM COATED ORAL at 09:27
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
## 2023-12-03 NOTE — DISCHARGE INSTRUCTIONS
Return to the ER for severe headache, vomiting, drainage from the wound, increasing pain, or other concerns    Follow-up with your primary care provider for staple removal in 10 days    Take Tylenol and ibuprofen as needed for mild pain

## 2023-12-14 ENCOUNTER — OFFICE VISIT (OUTPATIENT)
Dept: URGENT CARE | Facility: PHYSICIAN GROUP | Age: 23
End: 2023-12-14
Payer: MEDICAID

## 2023-12-14 VITALS
OXYGEN SATURATION: 96 % | WEIGHT: 192 LBS | BODY MASS INDEX: 27.49 KG/M2 | HEIGHT: 70 IN | DIASTOLIC BLOOD PRESSURE: 72 MMHG | RESPIRATION RATE: 16 BRPM | HEART RATE: 79 BPM | SYSTOLIC BLOOD PRESSURE: 118 MMHG

## 2023-12-14 DIAGNOSIS — Z48.02 ENCOUNTER FOR STAPLE REMOVAL: ICD-10-CM

## 2023-12-14 PROCEDURE — 3074F SYST BP LT 130 MM HG: CPT | Performed by: PHYSICIAN ASSISTANT

## 2023-12-14 PROCEDURE — 3078F DIAST BP <80 MM HG: CPT | Performed by: PHYSICIAN ASSISTANT

## 2023-12-14 PROCEDURE — 99202 OFFICE O/P NEW SF 15 MIN: CPT | Performed by: PHYSICIAN ASSISTANT

## 2023-12-14 PROCEDURE — 15853 REMOVAL SUTR/STAPL XREQ ANES: CPT | Performed by: PHYSICIAN ASSISTANT

## 2023-12-14 ASSESSMENT — ENCOUNTER SYMPTOMS
FEVER: 0
BLURRED VISION: 0
HEADACHES: 0
NAUSEA: 0
DOUBLE VISION: 0
VOMITING: 0
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
## 2024-03-30 ENCOUNTER — HOSPITAL ENCOUNTER (EMERGENCY)
Facility: MEDICAL CENTER | Age: 24
End: 2024-03-31
Attending: STUDENT IN AN ORGANIZED HEALTH CARE EDUCATION/TRAINING PROGRAM
Payer: MEDICAID

## 2024-03-30 ENCOUNTER — APPOINTMENT (OUTPATIENT)
Dept: RADIOLOGY | Facility: MEDICAL CENTER | Age: 24
End: 2024-03-30
Attending: STUDENT IN AN ORGANIZED HEALTH CARE EDUCATION/TRAINING PROGRAM
Payer: MEDICAID

## 2024-03-30 VITALS
TEMPERATURE: 98.3 F | HEART RATE: 77 BPM | BODY MASS INDEX: 27.13 KG/M2 | RESPIRATION RATE: 16 BRPM | OXYGEN SATURATION: 96 % | WEIGHT: 183.2 LBS | DIASTOLIC BLOOD PRESSURE: 87 MMHG | HEIGHT: 69 IN | SYSTOLIC BLOOD PRESSURE: 139 MMHG

## 2024-03-30 DIAGNOSIS — M25.562 ACUTE PAIN OF LEFT KNEE: ICD-10-CM

## 2024-03-30 DIAGNOSIS — M25.462 EFFUSION OF LEFT KNEE: ICD-10-CM

## 2024-03-30 DIAGNOSIS — S89.92XA INJURY OF LEFT KNEE, INITIAL ENCOUNTER: ICD-10-CM

## 2024-03-30 PROCEDURE — 99284 EMERGENCY DEPT VISIT MOD MDM: CPT

## 2024-03-30 ASSESSMENT — PAIN DESCRIPTION - PAIN TYPE: TYPE: ACUTE PAIN

## 2024-03-31 PROCEDURE — 700102 HCHG RX REV CODE 250 W/ 637 OVERRIDE(OP): Performed by: STUDENT IN AN ORGANIZED HEALTH CARE EDUCATION/TRAINING PROGRAM

## 2024-03-31 PROCEDURE — 73706 CT ANGIO LWR EXTR W/O&W/DYE: CPT | Mod: LT

## 2024-03-31 PROCEDURE — 700117 HCHG RX CONTRAST REV CODE 255: Performed by: STUDENT IN AN ORGANIZED HEALTH CARE EDUCATION/TRAINING PROGRAM

## 2024-03-31 PROCEDURE — A9270 NON-COVERED ITEM OR SERVICE: HCPCS | Performed by: STUDENT IN AN ORGANIZED HEALTH CARE EDUCATION/TRAINING PROGRAM

## 2024-03-31 RX ORDER — NAPROXEN 375 MG/1
375 TABLET ORAL 2 TIMES DAILY WITH MEALS
Qty: 14 TABLET | Refills: 0 | Status: SHIPPED | OUTPATIENT
Start: 2024-03-31 | End: 2024-04-07

## 2024-03-31 RX ORDER — NAPROXEN 250 MG/1
500 TABLET ORAL ONCE
Status: COMPLETED | OUTPATIENT
Start: 2024-03-31 | End: 2024-03-31

## 2024-03-31 RX ADMIN — NAPROXEN 500 MG: 250 TABLET ORAL at 02:28

## 2024-03-31 RX ADMIN — IOHEXOL 100 ML: 350 INJECTION, SOLUTION INTRAVENOUS at 00:39

## 2024-03-31 NOTE — ED NOTES
Patient ambulates to the ER accompanied by his girlfriend complaining of left knee pain. Patient reports that he hyperextended his knee approximately 6 months ago, and then re-injured his knee again yesterday by slipping on ice and landing on his leg. Patient's left knee is visibly swollen when compared to his other and he reports pain on palpation to the medial side and into the posterior side of his knee.

## 2024-03-31 NOTE — ED TRIAGE NOTES
Chief Complaint   Patient presents with    Knee Pain     Pt Aox4, report left knee pain and swelling. Pt slipped and fell today at 0230am and hit his left knee on the ground. Denies head injury, LOC. No open wounds.

## 2024-03-31 NOTE — ED PROVIDER NOTES
ED Provider Note    CHIEF COMPLAINT  Chief Complaint   Patient presents with    Knee Pain     Pt Aox4, report left knee pain and swelling. Pt slipped and fell today at 0230am and hit his left knee on the ground. Denies head injury, LOC. No open wounds.        EXTERNAL RECORDS REVIEWED    HPI/ROS  LIMITATION TO HISTORY   Select: : None  OUTSIDE HISTORIAN(S):    Ry Damon is a 24 y.o. male who presents with left knee pain after a ground-level fall yesterday.  Patient reportedly slipped outside of the GSR and had his knee hyperflexed.  Patient reports an injury 6 months ago where he hyperextended the knee that is affected.  Patient reports that he felt like his left knee dislocated.  Patient reports he felt a pop.  Patient denies numbness or weakness of the left lower extremity but does report some paresthesias in the plantar aspect of the foot.  Patient denies hip pain, head or neck trauma or any other injury.    PAST MEDICAL HISTORY   has a past medical history of Asthma.    SURGICAL HISTORY   has a past surgical history that includes evacuation of hematoma (Right, 2019); zygomatic arch orif (Right, 2019); and submandible abscess incision and drainage (Right, 2019).    FAMILY HISTORY  Family History   Problem Relation Age of Onset    No Known Problems Mother     No Known Problems Father     Cancer Neg Hx     Diabetes Neg Hx     Heart Disease Neg Hx     Stroke Neg Hx        SOCIAL HISTORY  Social History     Tobacco Use    Smoking status: Former     Current packs/day: 0.00     Types: Cigarettes     Quit date: 2016     Years since quittin.7    Smokeless tobacco: Never   Vaping Use    Vaping Use: Never used   Substance and Sexual Activity    Alcohol use: Yes     Comment: has a few drinks once a week    Drug use: Yes     Comment: marijuana - smoke    Sexual activity: Not Currently       CURRENT MEDICATIONS  Home Medications       Reviewed by Chele Mercado R.N. (Registered Nurse)  "on 03/30/24 at 2339  Med List Status: Partial     Medication Last Dose Status        Patient Marek Taking any Medications                           ALLERGIES  No Known Allergies    PHYSICAL EXAM  VITAL SIGNS: /87   Pulse 77   Temp 36.8 °C (98.3 °F) (Temporal)   Resp 16   Ht 1.753 m (5' 9\")   Wt 83.1 kg (183 lb 3.2 oz)   SpO2 96%   BMI 27.05 kg/m²    Vitals and nursing note reviewed.   Constitutional:       Comments: Patient is lying in bed supine, pleasant, conversant, speaking in complete sentences   HENT:      Head: Normocephalic and atraumatic.   Eyes:      Extraocular Movements: Extraocular movements intact.      Conjunctiva/sclera: Conjunctivae normal.      Pupils: Pupils are equal, round, and reactive to light.   Cardiovascular:      Pulses: Normal pulses.      Comments: HR 77  Pulmonary:      Effort: Pulmonary effort is normal. No respiratory distress.   Musculoskeletal:      Left knee swelling more proximal and lateral, tenderness to palpation present. Distal affected extremity demonstrates intact sensation, motor, cap refill less than 2 seconds and 2+ pulses, warm and well perfused, no signs of tendon rupture, no crepitus on palpation.  Patient does not tolerate ranging due to severe pain.     Cervical back: Normal range of motion. No rigidity.   Skin:     General: Skin is warm and dry.      Capillary Refill: Capillary refill takes less than 2 seconds.   Neurological:      Mental Status: Alert.       EKG/LABS  Results for orders placed or performed during the hospital encounter of 07/05/20   CBC WITH DIFFERENTIAL   Result Value Ref Range    WBC 16.5 (H) 4.8 - 10.8 K/uL    RBC 5.14 4.70 - 6.10 M/uL    Hemoglobin 16.2 14.0 - 18.0 g/dL    Hematocrit 47.6 42.0 - 52.0 %    MCV 92.6 81.4 - 97.8 fL    MCH 31.5 27.0 - 33.0 pg    MCHC 34.0 33.7 - 35.3 g/dL    RDW 42.5 35.9 - 50.0 fL    Platelet Count 212 164 - 446 K/uL    MPV 9.3 9.0 - 12.9 fL    Neutrophils-Polys 83.70 (H) 44.00 - 72.00 %    " Lymphocytes 5.00 (L) 22.00 - 41.00 %    Monocytes 9.90 0.00 - 13.40 %    Eosinophils 0.20 0.00 - 6.90 %    Basophils 0.40 0.00 - 1.80 %    Immature Granulocytes 0.80 0.00 - 0.90 %    Nucleated RBC 0.00 /100 WBC    Neutrophils (Absolute) 13.84 (H) 1.82 - 7.42 K/uL    Lymphs (Absolute) 0.83 (L) 1.00 - 4.80 K/uL    Monos (Absolute) 1.63 (H) 0.00 - 0.85 K/uL    Eos (Absolute) 0.04 0.00 - 0.51 K/uL    Baso (Absolute) 0.07 0.00 - 0.12 K/uL    Immature Granulocytes (abs) 0.13 (H) 0.00 - 0.11 K/uL    NRBC (Absolute) 0.00 K/uL   COMP METABOLIC PANEL   Result Value Ref Range    Sodium 136 135 - 145 mmol/L    Potassium 4.2 3.6 - 5.5 mmol/L    Chloride 100 96 - 112 mmol/L    Co2 20 20 - 33 mmol/L    Anion Gap 16.0 7.0 - 16.0    Glucose 111 (H) 65 - 99 mg/dL    Bun 11 8 - 22 mg/dL    Creatinine 1.00 0.50 - 1.40 mg/dL    Calcium 9.7 8.5 - 10.5 mg/dL    AST(SGOT) 26 12 - 45 U/L    ALT(SGPT) 35 2 - 50 U/L    Alkaline Phosphatase 103 (H) 30 - 99 U/L    Total Bilirubin 0.8 0.1 - 1.5 mg/dL    Albumin 4.6 3.2 - 4.9 g/dL    Total Protein 8.2 6.0 - 8.2 g/dL    Globulin 3.6 (H) 1.9 - 3.5 g/dL    A-G Ratio 1.3 g/dL   Group A Strep by PCR    Specimen: Throat   Result Value Ref Range    Group A Strep by PCR Not Detected Not Detected   MONONUCLEOSIS TEST QUAL   Result Value Ref Range    Heterophile Screen Negative Negative   LACTIC ACID   Result Value Ref Range    Lactic Acid 1.5 0.5 - 2.0 mmol/L   ESTIMATED GFR   Result Value Ref Range    GFR If African American >60 >60 mL/min/1.73 m 2    GFR If Non African American >60 >60 mL/min/1.73 m 2   COVID/SARS CoV-2 PCR    Specimen: Nasopharyngeal; Respirate   Result Value Ref Range    COVID Order Status Received    Chlamydia/GC PCR Urine Or Swab    Specimen: Urine, First Catch; Genital   Result Value Ref Range    C. trachomatis by PCR Negative Negative    N. gonorrhoeae by PCR Negative Negative    Source Urine    SARS-CoV-2, PCR (In-House)   Result Value Ref Range    SARS-CoV-2 Source NP Swab      SARS-CoV-2 by PCR NotDetected      I have independently interpreted this EKG    RADIOLOGY  I have independently interpreted the diagnostic imaging associated with this visit and am waiting the final reading from the radiologist.   My preliminary interpretation is as follows: No fracture    Radiologist interpretation:  CT-CTA LOWER EXT WITH & W/O-POST PROCESS LEFT   Final Result   Addendum (preliminary) 1 of 1   Additional reconstructions of the knee with bone algorithm were performed.    There is no evidence of fracture or dislocation. Fluid is identified    within the joint space.      Final      1.  No evidence of arterial injury or occlusion bilaterally.          COURSE & MEDICAL DECISION MAKING    ASSESSMENT, COURSE AND PLAN  Care Narrative: Given possible history of dislocation CTA ordered to rule out arterial injury.  No evidence of compartment syndrome at this time, lower leg is soft, left lower extremity distally is neurovascularly intact.  CT also will help evaluate for osseous injury.  I do also highly suspect ACL injury, PCL and meniscal injury also within the differential.  Patient will likely require outpatient MRI.  Patient reports he took a Percocet prior to arrival.    Electronically signed by: Derik Dacosta M.D., 3/30/2024 11:48 PM    No evidence of arterial injury on CT angio.  No evidence of fracture on CT imaging either.  I am very concerned about a soft tissue injury, ACL most likely.  Patient given knee immobilizer, crutches, counseled on rest, ice, compression, elevation and prescribed NSAIDs.  Patient given referral to Dr. Castellanos with orthopedic surgery for further workup and likely MRI imaging.    Repeat physical exam benign.  I doubt any serious emergency process at this time.  Patient and/or family, friends given strict return precautions for worsening symptoms and care instructions. They have demonstrated understanding of discharge instructions through teach back mechanism.  Advised PCP follow-up in 1-2 days.  Patient/family/friend expresses understanding and agrees to plan.    This dictation has been created using voice recognition software. I am continuously working with the software to minimize the number of voice recognition errors and I have made every attempt to manually correct the errors within my dictation. However errors  related to this voice recognition software may still exist and should be interpreted within the appropriate context.     Electronically signed by: Derik Dacosta M.D., 3/31/2024 2:55 AM    DISPOSITION AND DISCUSSIONS    Decision tools and prescription drugs considered including, but not limited to: Pain Medications   over-the-counter pain medications are appropriate, narcotics not indicated at this time  .    FINAL DIAGNOSIS  1. Acute pain of left knee    2. Effusion of left knee    3. Injury of left knee, initial encounter           Electronically signed by: Derik Dacosta M.D., 3/30/2024 11:44 PM

## (undated) DEVICE — DRAPE LARGE 3 QUARTER - (20/CA)

## (undated) DEVICE — SUTURE GENERAL

## (undated) DEVICE — DETERGENT RENUZYME PLUS 10 OZ PACKET (50/BX)

## (undated) DEVICE — NEPTUNE 4 PORT MANIFOLD - (20/PK)

## (undated) DEVICE — PROTECTOR CORNEAL - (10/BX)

## (undated) DEVICE — PROTECTOR ULNA NERVE - (36PR/CA)

## (undated) DEVICE — CANISTER SUCTION 3000ML MECHANICAL FILTER AUTO SHUTOFF MEDI-VAC NONSTERILE LF DISP  (40EA/CA)

## (undated) DEVICE — CATHETER IV 14 GA X 2 ---SURG.& SDS ONLY---(200EA/CA)

## (undated) DEVICE — NEEDLE NON SAFETY 25 GA X 1 1/2 IN HYPO (100EA/BX)

## (undated) DEVICE — TUBE E-T HI-LO CUFF 7.0MM (10EA/PK)

## (undated) DEVICE — BLADE SURGICAL #15 - (50/BX 3BX/CA)

## (undated) DEVICE — ELECTRODE DUAL RETURN W/ CORD - (50/PK)

## (undated) DEVICE — SET EXTENSION WITH 2 PORTS (48EA/CA) ***PART #2C8610 IS A SUBSTITUTE*****

## (undated) DEVICE — GLOVE BIOGEL SZ 7 SURGICAL PF LTX - (50PR/BX 4BX/CA)

## (undated) DEVICE — PACK MINOR BASIN - (2EA/CA)

## (undated) DEVICE — SUTURE 4-0 PROLENE PS-2 18 (36PK/BX)"

## (undated) DEVICE — TUBING CLEARLINK DUO-VENT - C-FLO (48EA/CA)

## (undated) DEVICE — HEAD HOLDER JUNIOR/ADULT

## (undated) DEVICE — DRAPE SURGICAL U 77X120 - (10/CA)

## (undated) DEVICE — BOVIE NEEDLE TIP 3CM COLORADO

## (undated) DEVICE — NEEDLE NON SAFETY HYPO 22 GA X 1 1/2 IN (100/BX)

## (undated) DEVICE — GOWN SURGICAL X-LARGE ULTRA - FILM-REINFORCED (20/CA)

## (undated) DEVICE — SUCTION INSTRUMENT YANKAUER BULBOUS TIP W/O VENT (50EA/CA)

## (undated) DEVICE — LACTATED RINGERS INJ 1000 ML - (14EA/CA 60CA/PF)

## (undated) DEVICE — GOWN WARMING STANDARD FLEX - (30/CA)

## (undated) DEVICE — CORDS BIPOLAR COAGULATION - 12FT STERILE DISP. (10EA/BX)

## (undated) DEVICE — KIT ANESTHESIA W/CIRCUIT & 3/LT BAG W/FILTER (20EA/CA)

## (undated) DEVICE — TOWELS CLOTH SURGICAL - (4/PK 20PK/CA)

## (undated) DEVICE — SODIUM CHL IRRIGATION 0.9% 1000ML (12EA/CA)

## (undated) DEVICE — SYRINGE EAR/NOSE 3 OZ STERILE (50/CA

## (undated) DEVICE — DRILL BIT STRYK UFS 6013505 - (2TX2=4)

## (undated) DEVICE — GLOVE BIOGEL SZ 6.5 SURGICAL PF LTX (50PR/BX 4BX/CA)

## (undated) DEVICE — GLOVE BIOGEL ECLIPSE PF LATEX SIZE 9.0

## (undated) DEVICE — SPONGE GAUZESTER 4 X 4 4PLY - (128PK/CA)

## (undated) DEVICE — TRAY SRGPRP PVP IOD WT PRP - (20/CA)

## (undated) DEVICE — SENSOR SPO2 NEO LNCS ADHESIVE (20/BX) SEE USER NOTES

## (undated) DEVICE — HEMOSTAT SURG ABSORBABLE - 2 X 3 IN SURGICEL (24EA/CA)

## (undated) DEVICE — BANDAGE ROLL STERILE BULKEE 4.5 IN X 4 YD (100EA/CA)

## (undated) DEVICE — SET LEADWIRE 5 LEAD BEDSIDE DISPOSABLE ECG (1SET OF 5/EA)

## (undated) DEVICE — GLOVE BIOGEL SZ 7.5 SURGICAL PF LTX - (50PR/BX 4BX/CA)

## (undated) DEVICE — SUTURE 3-0 CHROMIC GUT FS-2 27 (36PK/BX)"

## (undated) DEVICE — ELECTRODE 850 FOAM ADHESIVE - HYDROGEL RADIOTRNSPRNT (50/PK)

## (undated) DEVICE — GLOVE BIOGEL PI INDICATOR SZ 7.0 SURGICAL PF LF - (50/BX 4BX/CA)

## (undated) DEVICE — GLOVE BIOGEL SZ 6 PF LATEX - (50EA/BX 4BX/CA)

## (undated) DEVICE — CHLORAPREP 26 ML APPLICATOR - ORANGE TINT(25/CA)

## (undated) DEVICE — MASK ANESTHESIA ADULT  - (100/CA)

## (undated) DEVICE — DRILL BIT UFS 6009504 - (2TX2=4)

## (undated) DEVICE — SUTURE 2-0 SILK BB 30 (36PK/BX)"

## (undated) DEVICE — KIT ROOM DECONTAMINATION

## (undated) DEVICE — GLOVE BIOGEL INDICATOR SZ 7SURGICAL PF LTX - (50/BX 4BX/CA)

## (undated) DEVICE — GLOVE BIOGEL ECLIPSE  PF LATEX SIZE 6.5 (50PR/BX)

## (undated) DEVICE — GLOVE COTTON STERILE (50/CA)

## (undated) DEVICE — SUTURE 5-0 PLAIN GUT PC-1 - (12/BX)

## (undated) DEVICE — HEMOSTAT SURG ABSORBABLE - 4 X 8 IN SURGICEL (24EA/CA)

## (undated) DEVICE — BOVIE BLADE COATED - (50/PK)

## (undated) DEVICE — SUTURE 4-0 VICRYL PLUS RB-1 - 27 INCH (36/BX)